# Patient Record
Sex: FEMALE | Race: WHITE | NOT HISPANIC OR LATINO
[De-identification: names, ages, dates, MRNs, and addresses within clinical notes are randomized per-mention and may not be internally consistent; named-entity substitution may affect disease eponyms.]

---

## 2021-03-31 PROBLEM — Z00.00 ENCOUNTER FOR PREVENTIVE HEALTH EXAMINATION: Status: ACTIVE | Noted: 2021-03-31

## 2021-04-06 ENCOUNTER — LABORATORY RESULT (OUTPATIENT)
Age: 56
End: 2021-04-06

## 2021-04-06 ENCOUNTER — APPOINTMENT (OUTPATIENT)
Dept: PULMONOLOGY | Facility: CLINIC | Age: 56
End: 2021-04-06
Payer: COMMERCIAL

## 2021-04-06 ENCOUNTER — NON-APPOINTMENT (OUTPATIENT)
Age: 56
End: 2021-04-06

## 2021-04-06 VITALS
HEIGHT: 68 IN | TEMPERATURE: 98.1 F | HEART RATE: 69 BPM | OXYGEN SATURATION: 97 % | DIASTOLIC BLOOD PRESSURE: 65 MMHG | SYSTOLIC BLOOD PRESSURE: 99 MMHG | BODY MASS INDEX: 28.49 KG/M2 | WEIGHT: 188 LBS

## 2021-04-06 DIAGNOSIS — E78.00 PURE HYPERCHOLESTEROLEMIA, UNSPECIFIED: ICD-10-CM

## 2021-04-06 DIAGNOSIS — C73 MALIGNANT NEOPLASM OF THYROID GLAND: ICD-10-CM

## 2021-04-06 DIAGNOSIS — Z80.42 FAMILY HISTORY OF MALIGNANT NEOPLASM OF PROSTATE: ICD-10-CM

## 2021-04-06 DIAGNOSIS — R06.02 SHORTNESS OF BREATH: ICD-10-CM

## 2021-04-06 PROCEDURE — 99072 ADDL SUPL MATRL&STAF TM PHE: CPT

## 2021-04-06 PROCEDURE — 71046 X-RAY EXAM CHEST 2 VIEWS: CPT

## 2021-04-06 PROCEDURE — 99245 OFF/OP CONSLTJ NEW/EST HI 55: CPT | Mod: 25

## 2021-04-07 ENCOUNTER — NON-APPOINTMENT (OUTPATIENT)
Age: 56
End: 2021-04-07

## 2021-04-07 LAB
ALBUMIN SERPL ELPH-MCNC: 4.6 G/DL
ALP BLD-CCNC: 123 U/L
ALT SERPL-CCNC: 13 U/L
ANION GAP SERPL CALC-SCNC: 13 MMOL/L
AST SERPL-CCNC: 16 U/L
BASOPHILS # BLD AUTO: 0.07 K/UL
BASOPHILS NFR BLD AUTO: 0.9 %
BILIRUB SERPL-MCNC: 0.2 MG/DL
BUN SERPL-MCNC: 13 MG/DL
CALCIUM SERPL-MCNC: 9.6 MG/DL
CH50 SERPL-MCNC: 73 U/ML
CHLORIDE SERPL-SCNC: 104 MMOL/L
CO2 SERPL-SCNC: 24 MMOL/L
CREAT SERPL-MCNC: 0.75 MG/DL
DEPRECATED D DIMER PPP IA-ACNC: <150 NG/ML DDU
EOSINOPHIL # BLD AUTO: 0.19 K/UL
EOSINOPHIL NFR BLD AUTO: 2.5 %
GLUCOSE SERPL-MCNC: 85 MG/DL
HCT VFR BLD CALC: 38.6 %
HGB BLD-MCNC: 12.1 G/DL
IMM GRANULOCYTES NFR BLD AUTO: 0.4 %
LYMPHOCYTES # BLD AUTO: 2.31 K/UL
LYMPHOCYTES NFR BLD AUTO: 30.7 %
MAN DIFF?: NORMAL
MCHC RBC-ENTMCNC: 28.8 PG
MCHC RBC-ENTMCNC: 31.3 GM/DL
MCV RBC AUTO: 91.9 FL
MONOCYTES # BLD AUTO: 0.49 K/UL
MONOCYTES NFR BLD AUTO: 6.5 %
NEUTROPHILS # BLD AUTO: 4.43 K/UL
NEUTROPHILS NFR BLD AUTO: 59 %
NT-PROBNP SERPL-MCNC: 118 PG/ML
PLATELET # BLD AUTO: 272 K/UL
POTASSIUM SERPL-SCNC: 4.3 MMOL/L
PROT SERPL-MCNC: 7.2 G/DL
RBC # BLD: 4.2 M/UL
RBC # FLD: 13 %
RHEUMATOID FACT SER QL: <10 IU/ML
SODIUM SERPL-SCNC: 140 MMOL/L
WBC # FLD AUTO: 7.52 K/UL

## 2021-04-07 NOTE — ASSESSMENT
[FreeTextEntry1] : 4-6-21\par \par It was a pleasure to meet Jessica today in consultation. Her respiratory issues are described below:\par \par 1. Shortness of breath\par Jessica has no history of asthma. Last February she had cough, chest tightness, fevers. She was treated with an inhaler and prednisone. She rides her bike and swims for exercise. She feels she has a decreased exercise tolerance and experiences shortness of breath and chest tightness while bike riding. She has gained 15 pounds.\par \par Possible causes of dyspnea on exertion include:\par a.  Left heart failure with preserved ejection fraction( diastolic dysfunction). We will order an echocardiogram to evaluate for left sided heart disease\par c. Pulmonary hypertension. We will order an echocardiogram to evaluate for pulmonary hypertension.  Clinically there is no evidence of pulmonary hypertension\par d. Pulmonary embolism. She has no history of blood clots either in herself or in her family. She may have had COVID in February 2020. We will check a D-dimer today\par e. Asthma. She reports having cough, fevers, congestion in Feb 2020. She was treated with prednisone and an inhaler. We will order complete PFT, 6MWT and FENO to evaluate her lung function and oxygen saturation while she is walking. Prior allergy testing in 2011 was negative. Today, we will check eosinophil count, IgE level and RAST\par f. Anemia. We will check hemoglobin today.  On clinical exam she does not appear anemic\par g. Interstitial lung disease. She reports a history of possible COVID-19 infection in February 2020: Chest x-ray today does not show any opacities. If PFT, 6MWT do not help to discern a cause for her shortness of breath, we will order a low dose chest CT\par h. Autoimmune disease. She reports having back stiffness and dry eyes. In 2011, GUILLERMO: positive, 1:320, speckled. We will order autoimmune screening labs today (CH50, RF, GUILLERMO)\par i. Diaphragmatic weakness. We will order a PI max\par \par Return to clinic: 3 months

## 2021-04-07 NOTE — CONSULT LETTER
[Dear  ___] : Dear  [unfilled], [Consult Letter:] : I had the pleasure of evaluating your patient, [unfilled]. [Please see my note below.] : Please see my note below. [Consult Closing:] : Thank you very much for allowing me to participate in the care of this patient.  If you have any questions, please do not hesitate to contact me. [Sincerely,] : Sincerely, [FreeTextEntry2] : Dr. Trinity Del Angel ([facility])\par [phone]\par [phone] Fax

## 2021-04-07 NOTE — HISTORY OF PRESENT ILLNESS
[TextBox_4] : 54 yo F pmhx thyroid ca (> 15 yrs) partial thyroidectomy (right) on levoxyl, high cholesterol on atorvastatin, zetia\par She enjoys swimming and bike riding\par She believes she had COVID in February 2020. Her symptoms included shortness of breath, fevers, chest congestion\par She was given an inhaler and prednisone\par no history of asthma\par She found out her co-worker had just been in a [place] for a week\par She works for the [email or web address]\par her father had prostate cancer, non-smoker\par She is a lifelong non smoker\par she feels that she cannot bike like she used to. She gets out of breath more quickly and feels chest tightness. When she stops, she feels okay\par She occasionally has a runny nose. She has dry eyes, uses eye drops\par she lives in an apartment, 7th floor\par She lives in the back of the building\par She bought an air filter. She doesn't think it is a HEPA filter. She puts it on once a week. Sometimes she has a little yorkie that she shares with her sister. She recently had him for a whole month\par she had antibody testing in August, 7 months later. Testing was negative\par she denies palpitations, chest pain\par She has gained 15 pounds\par she has back stiffness \par no history of blood clots. She experiences tightness in her calves

## 2021-04-07 NOTE — DISCUSSION/SUMMARY
[FreeTextEntry1] : Attending's Summary\par 4-6-21:\par -no pallor or icterus \par -no JVD at 45 degrees, no hepatojugular reflux \par -no dullness, good air entry bilaterally, no wheezing, rhonchi or crackles \par -normal S1, S2, no murmurs, rubs, gallops \par -no clinically detected hepatosplenomegaly \par -no articular manifestations, no skin thickening, no palmar erythema, radial pulses good \par -no edema \par

## 2021-04-07 NOTE — PHYSICAL EXAM
[No Acute Distress] : no acute distress [Normal Oropharynx] : normal oropharynx [Normal Appearance] : normal appearance [No Neck Mass] : no neck mass [Normal Rate/Rhythm] : normal rate/rhythm [Normal S1, S2] : normal s1, s2 [No Murmurs] : no murmurs [No Resp Distress] : no resp distress [Clear to Auscultation Bilaterally] : clear to auscultation bilaterally [No Abnormalities] : no abnormalities [Benign] : benign [Normal Gait] : normal gait [No Clubbing] : no clubbing [No Cyanosis] : no cyanosis [No Edema] : no edema [FROM] : FROM [Normal Color/ Pigmentation] : normal color/ pigmentation [No Focal Deficits] : no focal deficits [Oriented x3] : oriented x3 [Normal Affect] : normal affect [No HSM] : no hsm [TextBox_11] : no pallor or icterus [TextBox_44] : no JVD at 45 degrees, no hepatojugular reflux [TextBox_68] : no dullness, good air entry bilaterally, no wheezing, rhonchi or crackles [TextBox_105] : no articular manifestations, no skin thickening, no palmar erythema, radial pulses good

## 2021-04-07 NOTE — PROCEDURE
[FreeTextEntry1] : chest x-ray 21: On PA sharp costophrenic angles, no bony abnormalities, trachea is central,cardiac size is normal, no opacities noted, no pleural effusions. Lateral: mild narrowing of the intervertebral space, no opacities\par \par Labs 11\par EBV AB IG.86 (high)\par EBV NUCLEAR Ag IgG >5.00\par negative RAST\par negative food allergy panel\par GUILLERMO: positive, 1:320, speckled\par negative RF, DS-DNA ab\par

## 2021-04-07 NOTE — REVIEW OF SYSTEMS
[Negative] : Psychiatric [Recent Wt Gain (___ Lbs)] : ~T recent [unfilled] lb weight gain [Dry Eyes] : dry eyes [Chest Tightness] : chest tightness [Dyspnea] : dyspnea [SOB on Exertion] : sob on exertion [Back Pain] : back pain [Thyroid Problem] : thyroid problem [Fever] : no fever [Chills] : no chills [Chest Discomfort] : no chest discomfort [Palpitations] : no palpitations [Hay Fever] : no hay fever [Watery Eyes] : no watery eyes [Itchy Eyes] : no itchy eyes [Seasonal Allergies] : no seasonal allergies [TextBox_144] : hx thyroid cancer

## 2021-04-08 ENCOUNTER — NON-APPOINTMENT (OUTPATIENT)
Age: 56
End: 2021-04-08

## 2021-04-08 LAB
DEPRECATED ENGL PLANTAIN IGE RAST QL: 0
DEPRECATED FIREBUSH IGE RAST QL: 0
DEPRECATED GOOSEFOOT IGE RAST QL: 0
DEPRECATED MARSH ELDER IGE RAST QL: 0
DEPRECATED SALTWORT IGE RAST QL: 0
ENGL PLANTAIN IGE QN: <0.1 KUA/L
FIREBUSH IGE QN: <0.1 KUA/L
GOOSEFOOT IGE QN: <0.1 KUA/L
MARSH ELDER IGE QN: <0.1 KUA/L
SALTWORT IGE QN: <0.1 KUA/L
TOTAL IGE SMQN RAST: 10 KU/L

## 2021-04-09 ENCOUNTER — NON-APPOINTMENT (OUTPATIENT)
Age: 56
End: 2021-04-09

## 2021-04-09 LAB
ANA PAT FLD IF-IMP: ABNORMAL
ANA PATTERN: ABNORMAL
ANA SER IF-ACNC: ABNORMAL
ANA TITER: ABNORMAL

## 2021-04-19 ENCOUNTER — LABORATORY RESULT (OUTPATIENT)
Age: 56
End: 2021-04-19

## 2021-04-20 ENCOUNTER — NON-APPOINTMENT (OUTPATIENT)
Age: 56
End: 2021-04-20

## 2021-04-21 ENCOUNTER — APPOINTMENT (OUTPATIENT)
Dept: PULMONOLOGY | Facility: CLINIC | Age: 56
End: 2021-04-21
Payer: COMMERCIAL

## 2021-04-21 PROCEDURE — 94618 PULMONARY STRESS TESTING: CPT

## 2021-04-21 PROCEDURE — ZZZZZ: CPT

## 2021-04-21 PROCEDURE — 99072 ADDL SUPL MATRL&STAF TM PHE: CPT

## 2021-04-21 PROCEDURE — 94727 GAS DIL/WSHOT DETER LNG VOL: CPT

## 2021-04-21 PROCEDURE — 94729 DIFFUSING CAPACITY: CPT

## 2021-04-21 PROCEDURE — 94060 EVALUATION OF WHEEZING: CPT

## 2021-04-23 ENCOUNTER — NON-APPOINTMENT (OUTPATIENT)
Age: 56
End: 2021-04-23

## 2021-05-12 ENCOUNTER — FORM ENCOUNTER (OUTPATIENT)
Age: 56
End: 2021-05-12

## 2021-05-13 ENCOUNTER — RESULT REVIEW (OUTPATIENT)
Age: 56
End: 2021-05-13

## 2021-05-13 ENCOUNTER — OUTPATIENT (OUTPATIENT)
Dept: OUTPATIENT SERVICES | Facility: HOSPITAL | Age: 56
LOS: 1 days | End: 2021-05-13
Payer: COMMERCIAL

## 2021-05-13 ENCOUNTER — APPOINTMENT (OUTPATIENT)
Dept: CT IMAGING | Facility: HOSPITAL | Age: 56
End: 2021-05-13
Payer: COMMERCIAL

## 2021-05-13 PROCEDURE — 71250 CT THORAX DX C-: CPT | Mod: 26

## 2021-05-13 PROCEDURE — 93306 TTE W/DOPPLER COMPLETE: CPT

## 2021-05-13 PROCEDURE — 71250 CT THORAX DX C-: CPT

## 2021-05-13 PROCEDURE — 93306 TTE W/DOPPLER COMPLETE: CPT | Mod: 26

## 2021-05-21 ENCOUNTER — OUTPATIENT (OUTPATIENT)
Dept: OUTPATIENT SERVICES | Facility: HOSPITAL | Age: 56
LOS: 1 days | End: 2021-05-21
Payer: COMMERCIAL

## 2021-05-21 DIAGNOSIS — R94.39 ABNORMAL RESULT OF OTHER CARDIOVASCULAR FUNCTION STUDY: ICD-10-CM

## 2021-05-21 PROCEDURE — 93017 CV STRESS TEST TRACING ONLY: CPT

## 2021-05-21 PROCEDURE — 78452 HT MUSCLE IMAGE SPECT MULT: CPT

## 2021-05-21 PROCEDURE — A9500: CPT

## 2021-05-21 PROCEDURE — 78452 HT MUSCLE IMAGE SPECT MULT: CPT | Mod: 26

## 2021-05-21 PROCEDURE — A9505: CPT

## 2021-05-21 PROCEDURE — 93018 CV STRESS TEST I&R ONLY: CPT

## 2021-05-21 PROCEDURE — 93016 CV STRESS TEST SUPVJ ONLY: CPT

## 2023-01-11 ENCOUNTER — TRANSCRIPTION ENCOUNTER (OUTPATIENT)
Age: 58
End: 2023-01-11